# Patient Record
Sex: FEMALE | Race: BLACK OR AFRICAN AMERICAN | NOT HISPANIC OR LATINO | Employment: FULL TIME | URBAN - METROPOLITAN AREA
[De-identification: names, ages, dates, MRNs, and addresses within clinical notes are randomized per-mention and may not be internally consistent; named-entity substitution may affect disease eponyms.]

---

## 2024-03-12 ENCOUNTER — TELEPHONE (OUTPATIENT)
Dept: HEPATOLOGY | Facility: CLINIC | Age: 69
End: 2024-03-12
Payer: COMMERCIAL

## 2024-03-12 NOTE — TELEPHONE ENCOUNTER
----- Message from Jennyfer Barker sent at 3/12/2024  1:44 PM CDT -----  Contact: self [phone]  Patient call in said she is in severe pain and need to be seen earlier than April please call back [phone]. Thanks IJ

## 2024-03-12 NOTE — TELEPHONE ENCOUNTER
----- Message from Jennyfer Barker sent at 3/12/2024  1:44 PM CDT -----  Contact: self [phone]  Patient call in said she is in severe pain and need to be seen earlier than April please call back [phone]. Thanks IJ

## 2024-03-12 NOTE — TELEPHONE ENCOUNTER
Returned call and explained that her current appointment is our first available. She states her mother is experiencing SOB when coming downstair, she feels that symptoms are worsening.     Are there any earlier slots?